# Patient Record
Sex: MALE | Race: WHITE | NOT HISPANIC OR LATINO
[De-identification: names, ages, dates, MRNs, and addresses within clinical notes are randomized per-mention and may not be internally consistent; named-entity substitution may affect disease eponyms.]

---
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
## 2024-12-20 ENCOUNTER — TRANSCRIPTION ENCOUNTER (OUTPATIENT)
Age: 24
End: 2024-12-20

## 2025-03-02 ENCOUNTER — EMERGENCY (EMERGENCY)
Facility: HOSPITAL | Age: 25
LOS: 1 days | Discharge: HOME | End: 2025-03-02
Attending: EMERGENCY MEDICINE
Payer: COMMERCIAL

## 2025-03-02 VITALS
TEMPERATURE: 97 F | SYSTOLIC BLOOD PRESSURE: 127 MMHG | OXYGEN SATURATION: 99 % | DIASTOLIC BLOOD PRESSURE: 77 MMHG | WEIGHT: 184.97 LBS | RESPIRATION RATE: 18 BRPM | HEART RATE: 81 BPM | HEIGHT: 73 IN

## 2025-03-02 PROCEDURE — 90471 IMMUNIZATION ADMIN: CPT

## 2025-03-02 PROCEDURE — 90715 TDAP VACCINE 7 YRS/> IM: CPT

## 2025-03-02 PROCEDURE — 99283 EMERGENCY DEPT VISIT LOW MDM: CPT | Mod: 25

## 2025-03-02 PROCEDURE — 99284 EMERGENCY DEPT VISIT MOD MDM: CPT

## 2025-03-02 RX ORDER — CLOSTRIDIUM TETANI TOXOID ANTIGEN (FORMALDEHYDE INACTIVATED), CORYNEBACTERIUM DIPHTHERIAE TOXOID ANTIGEN (FORMALDEHYDE INACTIVATED), BORDETELLA PERTUSSIS TOXOID ANTIGEN (GLUTARALDEHYDE INACTIVATED), BORDETELLA PERTUSSIS FILAMENTOUS HEMAGGLUTININ ANTIGEN (FORMALDEHYDE INACTIVATED), BORDETELLA PERTUSSIS PERTACTIN ANTIGEN, AND BORDETELLA PERTUSSIS FIMBRIAE 2/3 ANTIGEN 5; 2; 2.5; 5; 3; 5 [LF]/.5ML; [LF]/.5ML; UG/.5ML; UG/.5ML; UG/.5ML; UG/.5ML
0.5 INJECTION, SUSPENSION INTRAMUSCULAR ONCE
Refills: 0 | Status: COMPLETED | OUTPATIENT
Start: 2025-03-02 | End: 2025-03-02

## 2025-03-02 RX ORDER — BACITRACIN 500 UNIT/G
1 OINTMENT (GRAM) TOPICAL ONCE
Refills: 0 | Status: COMPLETED | OUTPATIENT
Start: 2025-03-02 | End: 2025-03-02

## 2025-03-02 RX ADMIN — Medication 1 APPLICATION(S): at 19:22

## 2025-03-02 RX ADMIN — CLOSTRIDIUM TETANI TOXOID ANTIGEN (FORMALDEHYDE INACTIVATED), CORYNEBACTERIUM DIPHTHERIAE TOXOID ANTIGEN (FORMALDEHYDE INACTIVATED), BORDETELLA PERTUSSIS TOXOID ANTIGEN (GLUTARALDEHYDE INACTIVATED), BORDETELLA PERTUSSIS FILAMENTOUS HEMAGGLUTININ ANTIGEN (FORMALDEHYDE INACTIVATED), BORDETELLA PERTUSSIS PERTACTIN ANTIGEN, AND BORDETELLA PERTUSSIS FIMBRIAE 2/3 ANTIGEN 0.5 MILLILITER(S): 5; 2; 2.5; 5; 3; 5 INJECTION, SUSPENSION INTRAMUSCULAR at 19:20

## 2025-03-02 NOTE — ED PROVIDER NOTE - OBJECTIVE STATEMENT
24-year-old male no reported PMHx presents to the ED complaining of wound to left shin.  Patient was skiing yesterday when his ski on his right foot cut his left shin while [place] the [place].  Patient continued to ski at that time, did not notice the cut until he got undressed later on.  Reports he washed wound out at that time with water and wrapped it with gauze.  Did not seek medical care at that time as he was in [place] and preferred to seek medical care here.  Incident at this point happened over 24 hours ago.  Has been ambulatory on the leg without difficulty.  Denies fever/chills, numbness/tingling, additional injury, redness around wound, discharge/pus from wound.

## 2025-03-02 NOTE — ED PROVIDER NOTE - PHYSICAL EXAMINATION
Gen: Well appearing, AAO x 3, NAD  Skin: 3.5 cm linear laceration noted to anterior aspect of proximal 1/3rd left shin w/ subcutaneous tissue noted, no active bleeding or drainage. There is not surrounding erythema around wound borders.   HEENT: NC/AT, PERRLA, EOMI, MMM  Resp: unlabored CTAB  Cardiac: rrr s1s2, no murmurs, rubs or gallops  Ext: Wound to LLE as above in skin. No overlying or surrounding tenderness to wound site. No underlying crepitus. Full AROM all joints of affected LLE. Sensation intact. Pulses full/equal b/l.  Neuro: no focal deficits

## 2025-03-02 NOTE — ED PROVIDER NOTE - PROGRESS NOTE DETAILS
Given wound over 24 hours will allow healing by secondary intention.  Wound was cleansed extensively at bedside with normal saline irrigation as well as chlorhexidine for disinfectant.  Steri-Strips applied overlying wound for loose approximation.  Patient and mother at bedside were counseled extensively on wound care, monitoring wound for any signs of infection and to return to the emergency department immediately if they notice any new/worsening symptoms or signs of infection. - JIE HerreraC Given wound over 24 hours will allow healing by secondary intention.  Wound was cleansed extensively at bedside with normal saline irrigation as well as chlorhexidine for disinfectant. Bacitracin and steri-Strips applied overlying wound for loose approximation.  Patient and mother at bedside were counseled extensively on wound care, monitoring wound for any signs of infection and to return to the emergency department immediately if they notice any new/worsening symptoms or signs of infection. - Praneeth Figueroa PA-C

## 2025-03-02 NOTE — ED PROVIDER NOTE - CLINICAL SUMMARY MEDICAL DECISION MAKING FREE TEXT BOX
Avis: 24 year old male with here with wound to left shin. went skiing yesterday and ski hit left shin while coming down mountain. washed it out and wrapped it and idid not seek medical care. drove down from [place] and came to ER.  HAPPENED 30 hours ago at this time. PE: alert, nad, nonlabored respirations, + s1s2, ext: wound to lle: 3.5 cm linear laceration to anterior aspect of proximal tibia with subcutaneous tissue noted, no active bleeding/drainage. no surrounding erythema around borders, + 2 dp b/l le, sensation intact, < 2 sec cap refill. alert and oriented x 3, no focal deficits. FROM of left leg, 5/5 b/l le, normal gait.  plan; will give tetanus, wound already starting to heal by secondary intention. wound cleaned and will apply  bacitracin.  f/u outaptient.

## 2025-03-02 NOTE — ED PROVIDER NOTE - NSFOLLOWUPINSTRUCTIONS_ED_ALL_ED_FT
Please follow-up with your primary care doctor or return to the emergency department within the next 2-3 days for a wound check.    You may wash wound gently with soap and water daily and apply over-the-counter bacitracin twice daily.    Take Tylenol 650 mg every 6 hours as needed for pain.     Return to the Emergency Department immediately if you develop any new/worsening symptoms including but not limited to increasing swelling/pain/redness of the wound, pus/discharge, fever, inability to walk or any other concerns. Please follow-up with your primary care doctor or return to the emergency department within the next 2-3 days for a wound check.    You may wash wound gently with soap and water daily and apply over-the-counter bacitracin twice daily.    The steri strips may fall off on their own.    Take Tylenol 650 mg every 6 hours as needed for pain.     Return to the Emergency Department immediately if you develop any new/worsening symptoms including but not limited to increasing swelling/pain/redness of the wound, pus/discharge, fever, inability to walk or any other concerns.

## 2025-03-02 NOTE — ED ADULT NURSE NOTE - NSFALLUNIVINTERV_ED_ALL_ED
Bed/Stretcher in lowest position, wheels locked, appropriate side rails in place/Call bell, personal items and telephone in reach/Instruct patient to call for assistance before getting out of bed/chair/stretcher/Non-slip footwear applied when patient is off stretcher/[place] to call system/Physically safe environment - no spills, clutter or unnecessary equipment/Purposeful proactive rounding/Room/bathroom lighting operational, light cord in reach

## 2025-03-02 NOTE — ED ADULT NURSE NOTE - OBJECTIVE STATEMENT
23 y/o male arrives to the ER complaining of lower leg injury. Pt reports developing  L shin injury while skiing yesterday, Reports a laceration with the lily blade on the upper lower L leg, On assessment pt is well appearing, A&Ox4, speaking coherently, airway is patent, breathing spontaneously and unlabored. Skin is dry, warm. No bleeding at the injury site,

## 2025-03-02 NOTE — ED PROVIDER NOTE - PATIENT PORTAL LINK FT
You can access the FollowMyHealth Patient Portal offered by [facility] by registering at the following website: [email or web address]. By joining [employer]’s FollowMyHealth portal, you will also be able to view your health information using other applications (apps) compatible with our system.